# Patient Record
Sex: FEMALE | URBAN - METROPOLITAN AREA
[De-identification: names, ages, dates, MRNs, and addresses within clinical notes are randomized per-mention and may not be internally consistent; named-entity substitution may affect disease eponyms.]

---

## 2023-05-12 ENCOUNTER — APPOINTMENT (RX ONLY)
Dept: URBAN - METROPOLITAN AREA CLINIC 57 | Facility: CLINIC | Age: 68
Setting detail: DERMATOLOGY
End: 2023-05-12

## 2023-05-12 DIAGNOSIS — L82.1 OTHER SEBORRHEIC KERATOSIS: ICD-10-CM | Status: STABLE

## 2023-05-12 DIAGNOSIS — D22 MELANOCYTIC NEVI: ICD-10-CM | Status: STABLE

## 2023-05-12 DIAGNOSIS — L81.4 OTHER MELANIN HYPERPIGMENTATION: ICD-10-CM

## 2023-05-12 DIAGNOSIS — L57.8 OTHER SKIN CHANGES DUE TO CHRONIC EXPOSURE TO NONIONIZING RADIATION: ICD-10-CM

## 2023-05-12 DIAGNOSIS — L81.0 POSTINFLAMMATORY HYPERPIGMENTATION: ICD-10-CM

## 2023-05-12 DIAGNOSIS — D18.0 HEMANGIOMA: ICD-10-CM

## 2023-05-12 PROBLEM — D22.5 MELANOCYTIC NEVI OF TRUNK: Status: ACTIVE | Noted: 2023-05-12

## 2023-05-12 PROBLEM — D22.4 MELANOCYTIC NEVI OF SCALP AND NECK: Status: ACTIVE | Noted: 2023-05-12

## 2023-05-12 PROBLEM — D18.01 HEMANGIOMA OF SKIN AND SUBCUTANEOUS TISSUE: Status: ACTIVE | Noted: 2023-05-12

## 2023-05-12 PROCEDURE — ? ADDITIONAL NOTES

## 2023-05-12 PROCEDURE — ? BENIGN DESTRUCTION COSMETIC

## 2023-05-12 PROCEDURE — ? TREATMENT REGIMEN

## 2023-05-12 PROCEDURE — ? FULL BODY SKIN EXAM

## 2023-05-12 PROCEDURE — ? COUNSELING

## 2023-05-12 PROCEDURE — 99203 OFFICE O/P NEW LOW 30 MIN: CPT

## 2023-05-12 ASSESSMENT — LOCATION DETAILED DESCRIPTION DERM
LOCATION DETAILED: RIGHT ANTERIOR PROXIMAL UPPER ARM
LOCATION DETAILED: RIGHT INFERIOR MEDIAL FOREHEAD
LOCATION DETAILED: LEFT ANTERIOR PROXIMAL UPPER ARM
LOCATION DETAILED: LEFT CENTRAL MALAR CHEEK
LOCATION DETAILED: INFERIOR MID FOREHEAD
LOCATION DETAILED: SUPERIOR THORACIC SPINE
LOCATION DETAILED: LEFT CLAVICULAR NECK
LOCATION DETAILED: INFERIOR THORACIC SPINE
LOCATION DETAILED: LEFT VENTRAL PROXIMAL FOREARM
LOCATION DETAILED: RIGHT MEDIAL SUPERIOR CHEST
LOCATION DETAILED: RIGHT INFERIOR CENTRAL MALAR CHEEK
LOCATION DETAILED: RIGHT CLAVICULAR NECK
LOCATION DETAILED: LEFT INFERIOR CENTRAL MALAR CHEEK
LOCATION DETAILED: RIGHT RIB CAGE
LOCATION DETAILED: RIGHT VENTRAL PROXIMAL FOREARM
LOCATION DETAILED: LEFT MEDIAL SUPERIOR CHEST

## 2023-05-12 ASSESSMENT — LOCATION ZONE DERM
LOCATION ZONE: FACE
LOCATION ZONE: TRUNK
LOCATION ZONE: NECK
LOCATION ZONE: ARM

## 2023-05-12 ASSESSMENT — LOCATION SIMPLE DESCRIPTION DERM
LOCATION SIMPLE: LEFT ANTERIOR NECK
LOCATION SIMPLE: RIGHT FOREHEAD
LOCATION SIMPLE: LEFT FOREARM
LOCATION SIMPLE: RIGHT FOREARM
LOCATION SIMPLE: UPPER BACK
LOCATION SIMPLE: CHEST
LOCATION SIMPLE: LEFT CHEEK
LOCATION SIMPLE: ABDOMEN
LOCATION SIMPLE: INFERIOR FOREHEAD
LOCATION SIMPLE: RIGHT CHEEK
LOCATION SIMPLE: LEFT UPPER ARM
LOCATION SIMPLE: RIGHT ANTERIOR NECK
LOCATION SIMPLE: RIGHT UPPER ARM

## 2023-05-12 NOTE — PROCEDURE: BENIGN DESTRUCTION COSMETIC
Price (Use Numbers Only, No Special Characters Or $): 150.00
Anesthesia Volume In Cc: 0.5
Detail Level: Detailed
Anesthesia Type: 1% Xylocaine with epinephrine
Post-Care Instructions: I reviewed with the patient in detail post-care instructions. Patient is to wear sunprotection, and avoid picking at any of the treated lesions. Pt may apply Vaseline to crusted or scabbing areas.
Consent: The patient's consent was obtained including but not limited to risks of crusting, scabbing, blistering, scarring, darker or lighter pigmentary change, recurrence, incomplete removal and infection.

## 2023-05-19 ENCOUNTER — APPOINTMENT (RX ONLY)
Dept: URBAN - METROPOLITAN AREA CLINIC 342 | Facility: CLINIC | Age: 68
Setting detail: DERMATOLOGY
End: 2023-05-19

## 2023-05-19 DIAGNOSIS — Z41.9 ENCOUNTER FOR PROCEDURE FOR PURPOSES OTHER THAN REMEDYING HEALTH STATE, UNSPECIFIED: ICD-10-CM

## 2023-05-19 PROCEDURE — ? PRODUCT LINE (REVISION)

## 2023-05-19 PROCEDURE — ? COSMETIC CONSULTATION: CHEMICAL PEELS

## 2023-05-19 PROCEDURE — ? COSMETIC CONSULTATION: IPL

## 2023-05-19 PROCEDURE — ? MEDICAL CONSULTATION HYDRAFACIAL

## 2023-05-19 PROCEDURE — ? VI PEEL

## 2023-05-19 PROCEDURE — ? MEDICAL CONSULTATION: LASER RESURFACING

## 2023-05-19 PROCEDURE — ? ADDITIONAL NOTES

## 2023-05-19 PROCEDURE — ? PRODUCT LINE (ELTA MD)

## 2023-05-19 NOTE — PROCEDURE: PRODUCT LINE (ELTA MD)
Product 1 Application Directions: Apply moisturizer prior to sunscreen application\\nReapply sun screen every 90 mins during sun exposure \\nUtilize sugar scrub body exfoliation to remove sunblock
Product 6 Units: 0
Product 20 Price (In Dollars - Numeric Only, No Special Characters Or $): 0.00
Name Of Product 4: UV elements
Product 6 Application Directions: Apply dime to nickel size amount to face every morning concluding your skincare regimen. You may apply makeup on top of sunscreen if desired.
Product 7 Price (In Dollars - Numeric Only, No Special Characters Or $): 20.00
Product 4 Price (In Dollars - Numeric Only, No Special Characters Or $): 35.50
Assigning Risk Information: Per AMA, level of risk is based upon consequences of the problem(s) addressed at the encounter when appropriately treated. Risk also includes medical decision making related to the need to initiate or forego further testing, treatment and/or hospitalization. Over the counter medication are assigned a risk level of low. Prescription medication management is assigned a risk level of moderate.
Name Of Product 2: Elta MD foaming cleanser
Risk Of Complication Category: No MDM
Name Of Product 7: Foaming cleanser (travel size)
Allow Plan To Count Towards E/M Coding: Yes
Product 4 Units: 1
Product 4 Application Directions: Apply nickel size amount in the AM post moisturizer
Product 2 Price (In Dollars - Numeric Only, No Special Characters Or $): 38.00
Name Of Product 5: UV replenish
Product 2 Application Directions: Wash face am/pm\\nWet hands, pump one pump into hands, lather, and apply to face.\\nRemove cleanser with clean wet washcloth.\\nWith washcloth, wipe around eyes
Name Of Product 3: Laser enzyme gel
Product 5 Application Directions: Use every morning post moisturizer
Product 3 Price (In Dollars - Numeric Only, No Special Characters Or $): 17.50
Name Of Product 1: UV Pure
Name Of Product 6: UV physical
Detail Level: Zone
Product 1 Price (In Dollars - Numeric Only, No Special Characters Or $): 28.00
Product 3 Application Directions: Apply nickel size amount to face AM/PM after you?ve cleansed your face. Avoid eye area.
Product 6 Price (In Dollars - Numeric Only, No Special Characters Or $): 34.00

## 2023-05-19 NOTE — PROCEDURE: PRODUCT LINE (REVISION)
Product 2 Units: 1
Product 45 Price (In Dollars - Numeric Only, No Special Characters Or $): 0.00
Product 12 Price (In Dollars - Numeric Only, No Special Characters Or $): 36.00
Product 38 Units: 0
Name Of Product 5: DEJ Night Face Cream
Product 19 Price (In Dollars - Numeric Only, No Special Characters Or $): 52.00
Product 2 Application Directions: Apply to neck, twice daily in upward motion.\\nApply a mineral sunscreen on top of the product when using in the AM\\nYou may also apply a pump of dej face cream to this product if more moisture is needed.
Name Of Product 10: Revox Line Relaxer
Name Of Product 17: Papaya Cleanser
Allow Plan To Count Towards E/M Coding: Yes
Product 14 Application Directions: Use twice daily, morning and night
Product 7 Application Directions: Apply to face after cleanser in the morning
Product 5 Price (In Dollars - Numeric Only, No Special Characters Or $): 168.00
Product 12 Application Directions: Apply at least 2x daily
Name Of Product 3: DEJ Face Cream
Product 10 Price (In Dollars - Numeric Only, No Special Characters Or $): 192.00
Product 19 Application Directions: Use once a week
Name Of Product 8: Retinol 0.5%
Product 17 Price (In Dollars - Numeric Only, No Special Characters Or $): 38.00
Name Of Product 15: Dej Face cream (travel size)
Product 5 Application Directions: Apply a pea sized amount to face only at night.
Product 3 Price (In Dollars - Numeric Only, No Special Characters Or $): 169.00
Name Of Product 13: Pumpkin Enzyme Mask
Product 10 Application Directions: Apply serum prior to moisturizer twice daily (AM/PM)
Name Of Product 20: Soothing Facial Rinse
Product 8 Price (In Dollars - Numeric Only, No Special Characters Or $): 100.00
Product 17 Application Directions: Wash face every other day with this cleanser and rotate with gentle cleanser
Product 15 Price (In Dollars - Numeric Only, No Special Characters Or $): 62.00
Name Of Product 6: Hydrating Serum
Product 13 Price (In Dollars - Numeric Only, No Special Characters Or $): 50.00
Product 20 Price (In Dollars - Numeric Only, No Special Characters Or $): 40.00
Product 1 Price (In Dollars - Numeric Only, No Special Characters Or $): 181.00
Product 3 Application Directions: Apply one to one in a half pumps to face in the morning and evening.
Name Of Product 18: Gentle Cleansing Lotion
Product 8 Application Directions: Apply pea sized amount to face only in the evening, either in conjunction with the dej face cream or separately. Begin using product on Monday?s and Thursday?s for 3-4 weeks before increasing usage. Avoid eye area.
Product 6 Price (In Dollars - Numeric Only, No Special Characters Or $): 90.00
Name Of Product 1: Vitamin C+ Corrective Cream 30%
Product 11 Price (In Dollars - Numeric Only, No Special Characters Or $): 180.00
Name Of Product 4: DEJ eye cream
Product 13 Application Directions: Using fingertips, apply generous amount hands, lightly scrub mask onto face avoiding eye area. Leave mask on 15-20 minutes. Remove with warm water on a clean wash cloth. Use once weekly.
Product 20 Application Directions: Recommended daily use unless dryness occurs
Name Of Product 16: Vitamin C 15%
Product 1 Application Directions: Apply one pump 2x daily before moisturizer
Name Of Product 9: Lumiquin hand cream
Product 6 Application Directions: Apply one pump, and mix with DEJ face cream twice daily
Detail Level: Zone
Product 4 Price (In Dollars - Numeric Only, No Special Characters Or $): 114.00
Name Of Product 14: Brightening face cleanser
Product 16 Price (In Dollars - Numeric Only, No Special Characters Or $): 133.00
Name Of Product 2: Nectifirm
Product 9 Price (In Dollars - Numeric Only, No Special Characters Or $): 58.00
Product 18 Application Directions: Utilize every other day
Product 11 Application Directions: Apply 1-2 Pumps prior to applying moisturizer (AM/PM)\\nYou may mix this product with your dej face cream for one single application, or you can layer your products.
Assigning Risk Information: Per AMA, level of risk is based upon consequences of the problem(s) addressed at the encounter when appropriately treated. Risk also includes medical decision making related to the need to initiate or forego further testing, treatment and/or hospitalization. Over the counter medication are assigned a risk level of low. Prescription medication management is assigned a risk level of moderate.
Product 2 Price (In Dollars - Numeric Only, No Special Characters Or $): 94.00
Name Of Product 12: Youthful lip replenisher
Name Of Product 19: Finishing Touch
Product 4 Application Directions: Apply pea sized amount to both eyes morning and night.
Product 16 Application Directions: Apply one pump to palm of hand and apply to face every morning before moisturizer
Risk Of Complication Category: No MDM
Product 9 Application Directions: Apply to backs of hands morning and night post 1 week vi peel

## 2023-05-19 NOTE — PROCEDURE: ADDITIONAL NOTES
Additional Notes: Discussed Tribella, quoted $1200ea
Render Risk Assessment In Note?: yes
Detail Level: Zone

## 2023-05-19 NOTE — PROCEDURE: VI PEEL
Post Peel Care: After the procedure, the patient was instructed not to wash the treated area for 4 hours or manually remove dead skin when the peeling process starts. Patient may use OTC hydrocortisone cream for itching.  Patient instructed to use the provided Retin-A wipes on the treated area on the 1st and 2nd nights. Patient advised to follow instructions for 7 days before returning to regular skincare regimen.\\n\\nInstructed patient to email me with any questions or concerns
Price (Use Numbers Only, No Special Characters Or $): 429.00
Post-Care Instructions: I reviewed with the patient in detail post-care instructions. Patient should avoid sun exposure and wear sun protection. Provided patient with a new clean sterile disposable face mask post treatment.
Chemical Peel: VI Peel Precision Plus
Detail Level: Zone
Prep: The treated area was degreased with pre-peel cleanser
Consent: Prior to the procedure, written consent was obtained and risks were reviewed, including but not limited to: redness, peeling, blistering, pigmentary change, scarring, infection, and pain. Patient is aware multiple treatments may be necessary to achieve the desired outcome.
Treatment Number: 1

## 2023-06-09 ENCOUNTER — APPOINTMENT (RX ONLY)
Dept: URBAN - METROPOLITAN AREA CLINIC 342 | Facility: CLINIC | Age: 68
Setting detail: DERMATOLOGY
End: 2023-06-09

## 2023-06-09 DIAGNOSIS — Z41.9 ENCOUNTER FOR PROCEDURE FOR PURPOSES OTHER THAN REMEDYING HEALTH STATE, UNSPECIFIED: ICD-10-CM

## 2023-06-09 PROCEDURE — ? HYDRAFACIAL

## 2023-06-09 NOTE — PROCEDURE: HYDRAFACIAL
Vacuum Pressure Low Setting (Will Not Render If Set To 0): 0
Solution: GlySal 7.5%
Tip: Hydropeel Tip, Clear
Tip: Hydropeel Tip, Purple Aggression
Vacuum Pressure Low Setting (Will Not Render If Set To 0): 16
Solution Override
Indication: anti-aging
Treatment Number: 1
Procedure: Peel
Vacuum Pressure Low Setting (Will Not Render If Set To 0): 14
Solution: Antiox-6
Post-Care Instructions: I reviewed with the patient in detail post-care instructions. Patient should avoid direct sun exposure and wear sunscreen daily.
Tip Override
Consent: Written consent obtained, risks reviewed including but not limited to crusting, scabbing, blistering, scarring, darker or lighter pigmentary change, bruising, and/or incomplete response.
Price (Use Numbers Only, No Special Characters Or $): 299.00
Location Override: Face and Neck
Solution: Activ-4
Solution Override: Circadia Protec Plus Booster
Solution Override: Antiox +
Location: face
Vacuum Pressure High Setting (Will Not Render If Set To 0): 10